# Patient Record
Sex: FEMALE | Race: WHITE | Employment: FULL TIME | URBAN - METROPOLITAN AREA
[De-identification: names, ages, dates, MRNs, and addresses within clinical notes are randomized per-mention and may not be internally consistent; named-entity substitution may affect disease eponyms.]

---

## 2018-03-21 VITALS
BODY MASS INDEX: 30.04 KG/M2 | HEART RATE: 71 BPM | SYSTOLIC BLOOD PRESSURE: 125 MMHG | TEMPERATURE: 99.7 F | DIASTOLIC BLOOD PRESSURE: 66 MMHG | WEIGHT: 153 LBS | OXYGEN SATURATION: 100 % | RESPIRATION RATE: 16 BRPM | HEIGHT: 60 IN

## 2018-03-21 PROCEDURE — 99283 EMERGENCY DEPT VISIT LOW MDM: CPT

## 2018-03-22 ENCOUNTER — HOSPITAL ENCOUNTER (EMERGENCY)
Age: 23
Discharge: HOME | End: 2018-03-22
Attending: EMERGENCY MEDICINE
Payer: OTHER GOVERNMENT
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
## 2018-03-22 DIAGNOSIS — M62.838 MUSCLE SPASM: ICD-10-CM

## 2018-03-22 DIAGNOSIS — S16.1XXA STRAIN OF NECK MUSCLE, INITIAL ENCOUNTER: ICD-10-CM

## 2018-03-22 RX ORDER — CYCLOBENZAPRINE HCL 10 MG
10 TABLET ORAL
Qty: 20 TAB | Refills: 0 | Status: SHIPPED | OUTPATIENT
Start: 2018-03-22
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
## 2018-03-22 NOTE — LETTER
[facility] [place] 
THE [facility] EMERGENCY DEPT 
[address] [id number] 
[phone] Work/School Note Date: 3/21/2018 To Whom It May concern: 
 
Zayda Lang was seen and treated today in the emergency room by the following provider(s): 
Attending Provider: Elmo An MD.   
 
Zayda Lang may return to work on 3/25/18. Sincerely, Enedelia Louise MD
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
